# Patient Record
(demographics unavailable — no encounter records)

---

## 2024-12-07 NOTE — HISTORY OF PRESENT ILLNESS
[de-identified] : Patient is a 62-year-old female here for evaluation of left shoulder and elbow.  Patient states on 12/6/2024 she was dancing, fell on on outstretched left arm.  Patient states that she is immediate pain to the elbow with mild discomfort to the left shoulder.  Patient states that she was told in the past that she may need a future total shoulder replacement.  Patient went to the hospital x-rays were taken of the left shoulder and elbow.  Patient was told that she had elbow fracture was placed in a splint and told to follow-up with orthopedics.   Denies numbness tingling  Left elbow exam: No effusion no ecchymosis no erythema, tenderness palpation to radial head. mild decreased limited range of motion with guarding in all planes secondary to discomfort.  Good strength throughout with discomfort no gross instability neurovascular intact  Left shoulder exam: No effusion no ecchymosis no erythema, no tense palpation good range of motion with minimal discomfort no gross instability neurovascular intact.  Reviewed x-rays left elbow from hospital showing acute nondisplaced radial head fracture  Reviewed left shoulder x-rays from hospital showing no acute fractures, subluxations, dislocations.  Advanced degenerative changes  Left shoulder: Patient has left shoulder strain.  Patient currently declined prescription anti-inflammatory medication.  Advise rest, ice/heat, gentle range of motion.  Patient will follow-up in 6 weeks with sports department if still having discomfort.  Left elbow: Discussed with patient detail that she has a radial head fracture.  These fractures generally heal well conservatively.  Fractures can take up to 6 to 8 weeks to fully heal.  Placed patient in sling to wear at all times for comfort for 3 to 5 days.  After that time patient will come out of sling and start gentle range of motion exercises.  Patient will follow-up with Dr. Villafuerte in 2 weeks for repeat elbow x-rays and follow-up.  Patient will continue taking over-the-counter Motrin/Tylenol as needed for pain, kindly declined prescription medication.  Patient will call office if symptoms worsen or change.  Advised against high-impact activities, heavy lifting, pushing, pulling.

## 2024-12-20 NOTE — ASSESSMENT
[FreeTextEntry1] :  The patient is being seen today for a left elbow fx. She injured it after falling on her arm outstretched. She was dancing and she fell.  Her arm is doing much better than at her initial visit.   left elbow swelling and palpable hemarthrosis minimal pain with palpation to the radial head. no pain with palpation to the distal humerus or ulna near full range of motion to the elbow  full active motion to the shoulder wrist, hand and fingers neurovascular intact to median, ulnar, radial nerves and palpable pulses with cap refill <2s  right elbow without tenderness, full active range of motion and neurovascular intact   x-ray left elbow 3 views shows closed nondisplaced fx of head of left radius   The patient is doing extremely well. We discussed that the fracture is healing as expected and to continue focus on regaining ROM at this time. The patient was advised of the diagnosis.  The natural history of the pathology was explained in full to the patient in layman's terms. All questions were answered.  We discussed that the radial head has a function in mobility and support of the elbow joint.  Minimally displaced fractures of the radial head/neck generally do well with non operative treatment.  We reviewed that although many people lose terminal extension, this is tolerated and that functional range of motion is considered to be  flexion extension and 50/50 supination/pronation.  The patient understands that they need to work on range of motion exercises and that Xrays and range of motion will be re-evaluated in about 3 weeks.  Formal therapy may be needed.  For comfort I have advised the patient that they may wear a sling for up to 3 days after the injury but then should begin range of motion exercises to minimize stiffness. Prolonged sling wear may cause permanent stiffness.  Surgery is occasionally required for contracture release or fixation/radial head arthroplasty.  The patient verbalized understanding of the above. The patient will follow up as needed.

## 2025-01-08 NOTE — HISTORY OF PRESENT ILLNESS
[de-identified] : Patient is here today for evaluation of her right hip she points to the groin as well as the lateral aspect of the hip going on for the last 1 to 2 months occasional having pain all the way down the right lower extremity  was present during exam and discussion  Physical exam she is tender over the greater trochanteric area she has some low back pain with straight leg raising as well as some  pain with range of motion of the right  X-rays of the hip show fairly well-maintained joint spaces left and right hip on the pelvic exam no fractures or tumors seen Spine x-rays do show significant facet arthritis LS spine 2 views AP and lateral  Diagnosis is right hip pain, right greater trochanteric bursitis, lumbar radiculopathy  Recommend prescription anti-inflammatories physical therapy I will see her back in 6 weeks if she fails to improve would order an MRI of the right hip to evaluate the labrum and see if there is any bone edema

## 2025-02-13 NOTE — HISTORY OF PRESENT ILLNESS
[de-identified] : 63f for f/u of rt hip pain prior hx of bilateral tka x-rays show realtively moderate oa but exam is significant for pain with all motions of hip, very irritable hip will get MRI to further evaluate f/u after MRI

## 2025-03-07 NOTE — HISTORY OF PRESENT ILLNESS
[de-identified] : Patient is a 63-year-old female here for reevaluation of right hip pain.  Reviewed MRI results of right hip in detail patient showing anterior and superior acetabular labral tear with associated paralabral cyst.  Moderate degenerative changes, partial tears of the gluteal tendons at the insertions of upon the greater trochanter with mild trochanteric bursitis.  Patient states that she has been taking meloxicam which has been helping her pain.  Right hip exam no effusion no ecchymosis no erythema no tense palpation good range of motion with pain to groin, positive impingement no gross instability neurovascular intact  Discussed with patient in detail her MRI results, discussed further treatment options in detail including conservative versus surgical interventions, patient states at this time she is not interested in surgical intervention, advised physical therapy continuation of meloxicam as needed for pain, discussed possibility of cortisone injection with pain management to further help symptoms.  Patient will follow-up with pain management for consult on right hip injection.  Patient will follow-up if pain worsens or if interested in surgical intervention

## 2025-04-02 NOTE — DISCUSSION/SUMMARY
[de-identified] : A discussion regarding available pain management treatment options occurred with the patient. These included interventional, rehabilitative, pharmacological, and alternative modalities. We will proceed with the following:    Interventional treatment options: 1. Proceed with a Right hip injection under fluoroscopic guidance.  The patient is having significant right hip pain with minimal relief with conservative treatments, so we decided to proceed with an intra-articular hip injection. The risks and benefits were discussed which included bruising, hematoma, worse pain, intravascular injection, steroid reaction. All questions were answered and concerns addressed. The patient understands that this is likely a temporary solution to their pain. The patient may have up to three intra-articular joint injections a year and needs to consider surgical options for longer term relief of pain should they not improve. They will schedule at the earliest convenience.   Medications: 1. Ordered Methylprednisolone 21-tablet, 6-day taper pack  * This is meant to be taken should she have a flare up while she is away on Vacation *  I advised Ms. Peterson that the steroid should be taken with food.  In addition, while taking the prescribed steroid, no over the counter or other NSAIDs should be used, such as ibuprofen (Motrin or Advil) or naproxen (Aleve) as this can cause stomach upset or other side effects.  If needed for fever or breakthrough pain Tylenol can be used.    Complementary treatment options: - lifestyle modifications discussed - avoid bending and bend with knees - avoid heavy lifting   - Follow up 2 weeks post injection.   I Concepción Pitts attest that this documentation has been prepared under the direction and in the presence of provider Dr. Faheem Vargas.  The documentation recorded by the scribe in my presence, accurately reflects the service I personally performed, and the decisions made by me with my edits as appropriate.   Faheem Vargas, DO

## 2025-04-02 NOTE — DATA REVIEWED
[FreeTextEntry1] : MRI of the right hip taken on 2- @ [facility] radiology IMPRESSION: Anterior and superior acetabular labral tears with associated para labral cysts. Mild to moderate degenerative changes of the hip. Partial tears of the gluteal tendons at the insertions upon the greater trochanter with associated mild trochanteric bursitis

## 2025-04-02 NOTE — PHYSICAL EXAM
[de-identified] : Right hip: Inspection: no evidence of atrophy, effusion, rash     Palpation: + tenderness over anterior hip, adductors, PSIS, gluteus, GTB    Stability: no gross instability bilaterally    Alignment: normal    ROM:   Painful reduced ROM especially flexion, external and internal rotation.      Special tests:  CRISTINA + (groin pain)    Stability:  No gross instability bilaterally

## 2025-04-02 NOTE — HISTORY OF PRESENT ILLNESS
[FreeTextEntry1] : Ms. Peterson is a 63-year-old female presenting to establish care for her right hip pain. She is being referred by Orthopedics to undergo interventional treatments as she is not a surgical candidate at the present. Her pain is in the right hip with referred pain into the buttock, groin and anterior aspect of the groin. She describes the pain as sharp, shooting, throbbing and aching. She does also note some intermittent pain in the lower back at times. Her pain is made worse with weight bearing activities. She has no pain when sitting. She tends to walk with a limp. She has been getting in and out of a car like she is 90 years old. She rates the pain anywhere from a 6 to an 9/10 on the pain scale. Her pain is present daily. She denies any bowel/bladder incontinence, fevers/chills, recent weight loss or any saddle anesthesia. She has been managing her pain with Advil Dual Action.

## 2025-04-02 NOTE — PHYSICAL EXAM
[de-identified] : Right hip: Inspection: no evidence of atrophy, effusion, rash     Palpation: + tenderness over anterior hip, adductors, PSIS, gluteus, GTB    Stability: no gross instability bilaterally    Alignment: normal    ROM:   Painful reduced ROM especially flexion, external and internal rotation.      Special tests:  CRISTINA + (groin pain)    Stability:  No gross instability bilaterally

## 2025-04-02 NOTE — DISCUSSION/SUMMARY
[de-identified] : A discussion regarding available pain management treatment options occurred with the patient. These included interventional, rehabilitative, pharmacological, and alternative modalities. We will proceed with the following:    Interventional treatment options: 1. Proceed with a Right hip injection under fluoroscopic guidance.  The patient is having significant right hip pain with minimal relief with conservative treatments, so we decided to proceed with an intra-articular hip injection. The risks and benefits were discussed which included bruising, hematoma, worse pain, intravascular injection, steroid reaction. All questions were answered and concerns addressed. The patient understands that this is likely a temporary solution to their pain. The patient may have up to three intra-articular joint injections a year and needs to consider surgical options for longer term relief of pain should they not improve. They will schedule at the earliest convenience.   Medications: 1. Ordered Methylprednisolone 21-tablet, 6-day taper pack  * This is meant to be taken should she have a flare up while she is away on Vacation *  I advised Ms. Peterson that the steroid should be taken with food.  In addition, while taking the prescribed steroid, no over the counter or other NSAIDs should be used, such as ibuprofen (Motrin or Advil) or naproxen (Aleve) as this can cause stomach upset or other side effects.  If needed for fever or breakthrough pain Tylenol can be used.    Complementary treatment options: - lifestyle modifications discussed - avoid bending and bend with knees - avoid heavy lifting   - Follow up 2 weeks post injection.   I Concepción Pitts attest that this documentation has been prepared under the direction and in the presence of provider Dr. Faheem Vargas.  The documentation recorded by the scribe in my presence, accurately reflects the service I personally performed, and the decisions made by me with my edits as appropriate.   Faheem Vargas, DO

## 2025-04-02 NOTE — DATA REVIEWED
[FreeTextEntry1] : MRI of the right hip taken on 2- @ [facility] radiology IMPRESSION: Anterior and superior acetabular labral tears with associated para labral cysts. Mild to moderate degenerative changes of the hip. Partial tears of the gluteal tendons at the insertions upon the greater trochanter with associated mild trochanteric bursitis

## 2025-04-07 NOTE — PROCEDURE
[FreeTextEntry3] : Date of Service: <<EHR Stamp.Today's Date__-99019>> Date of Service: 04/07/2025   Account: [id number]  Patient: ROWDY DELGADO   YOB: 1962  Age: 63 year  Surgeon:      Faheem Vargas DO  Pre-Operative Diagnosis: Right Primary Osteoarthritis of Hip (M16.0)  Post-Operative Diagnosis: Same  Procedure: Right Hip arthrogram and steroid injection under fluoroscopic guidance.    This procedure was carried out using fluoroscopic guidance. The risks and benefits of the procedure were discussed extensively with the patient. The consent of the patient was obtained and the following procedure was performed. The patient was placed in the supine position on the fluoroscopic table and the area was prepped and draped in a sterile fashion. A timeout was performed with all essential staff present and the site and side were verified.  The patient was placed in the supine position with right hip flexed and externally rotated 25 degrees. The area of the right groin was prepped and draped in a sterile fashion. The fluoroscopic image intensifier was then positioned so that the right hip appeared in view, and the midline intertrochanteric region was identified and marked. A 25 gauge spinal needle was then inserted and directed into this right hip intra-capsular region. After negative aspiration for heme and CSF, 3 cc of Omnipaque was injected and appeared to fill the joint margins.  Right hip arthrogram showed no intravascular flow, and good spread around the femoral head and to the acetabulum. An injectate of 4 cc 0.25% Marcaine plus 10mg decadron was then injected into the right hip space.   The needle was subsequently removed. Vital signs remained normal. Pulse oximeter was used throughout the procedure and the patient's pulse and oxygen saturation remained within normal limits. The patient tolerated the procedure well. There were no complications. The patient was instructed to apply ice over the injection sites for twenty minutes every two hours for the next 24 to 48 hours.  Disposition:   1. The patient was advised to F/U in 1-2 weeks to assess the response to the injection.  2. The patient was also instructed to contact me immediately if there were any concerns related to the procedure performed.

## 2025-04-23 NOTE — DATA REVIEWED
[FreeTextEntry1] : MRI of the right hip taken on 2- @ [facility] radiology IMPRESSION: Anterior and superior acetabular labral tears with associated para labral cysts. Mild to moderate degenerative changes of the hip. Partial tears of the gluteal tendons at the insertions upon the greater trochanter with associated mild trochanteric bursitis

## 2025-04-23 NOTE — PHYSICAL EXAM
[de-identified] : Right hip: Inspection: no evidence of atrophy, effusion, rash     Palpation: + tenderness over anterior hip, adductors, PSIS, gluteus, GTB    Stability: no gross instability bilaterally    Alignment: normal    ROM:   Painful reduced ROM especially flexion, external and internal rotation.      Special tests:  CRISTINA + (groin pain)    Stability:  No gross instability bilaterally   L spine   No rashes, erythema, edema noted TTP right lumbar paraspinals and sciatic notch Positive straight leg raise on the right LLE: 5/5 strength RLE: EHL 4/5  Sensation intact b/l LE

## 2025-04-23 NOTE — DATA REVIEWED
[FreeTextEntry1] : MRI of the right hip taken on 2- @ [facility] radiology IMPRESSION: Anterior and superior acetabular labral tears with associated para labral cysts. Mild to moderate degenerative changes of the hip. Partial tears of the gluteal tendons at the insertions upon the greater trochanter with associated mild trochanteric bursitis

## 2025-04-23 NOTE — DISCUSSION/SUMMARY
[de-identified] : A discussion regarding available pain management treatment options occurred with the patient. These included interventional, rehabilitative, pharmacological, and alternative modalities. We will proceed with the following:    Interventional treatment options: - None indicated at this time.   Imagin. MRI lumbar spine w/o contrast to evaluate for anatomic changes of the lumbar discs, nerves, and surrounding tissue that will help provide information to accurately diagnose the patient's cause of pain and therefore treat said pain generator in the most effective way possible - whether that be specific physical therapy recommendations, medications, and/or interventional therapies.   Rehabilitative options: 1. Participate in physical therapy for the lumbar spine, 2/3x per week for 6-8 weeks.  Physical therapy of the lumbar spine 2-3 times a week for 4-8 weeks stressing a home exercise program of walking, shoulder griddle strengthening,  swimming, elliptical , recumbent bike, Richard chi and Yoga. Use things that heat like hot shower or icy heat before rehab, exercising and at the beginning of the day, and ice (ice in a bag never directly on the skin) after activity and at the end of the day.   Complementary treatment options: - lifestyle modifications discussed - avoid bending and bend with knees - avoid heavy lifting   - Follow up in 2-3 weeks to discuss imaging.   I Concepción Pitts attest that this documentation has been prepared under the direction and in the presence of provider Dr. Faheem Vargas.  The documentation recorded by the scribe in my presence, accurately reflects the service I personally performed, and the decisions made by me with my edits as appropriate.   Faheem Vargas, DO

## 2025-04-23 NOTE — DISCUSSION/SUMMARY
[de-identified] : A discussion regarding available pain management treatment options occurred with the patient. These included interventional, rehabilitative, pharmacological, and alternative modalities. We will proceed with the following:    Interventional treatment options: - None indicated at this time.   Imagin. MRI lumbar spine w/o contrast to evaluate for anatomic changes of the lumbar discs, nerves, and surrounding tissue that will help provide information to accurately diagnose the patient's cause of pain and therefore treat said pain generator in the most effective way possible - whether that be specific physical therapy recommendations, medications, and/or interventional therapies.   Rehabilitative options: 1. Participate in physical therapy for the lumbar spine, 2/3x per week for 6-8 weeks.  Physical therapy of the lumbar spine 2-3 times a week for 4-8 weeks stressing a home exercise program of walking, shoulder griddle strengthening,  swimming, elliptical , recumbent bike, Richard chi and Yoga. Use things that heat like hot shower or icy heat before rehab, exercising and at the beginning of the day, and ice (ice in a bag never directly on the skin) after activity and at the end of the day.   Complementary treatment options: - lifestyle modifications discussed - avoid bending and bend with knees - avoid heavy lifting   - Follow up in 2-3 weeks to discuss imaging.   I Concepción Pitts attest that this documentation has been prepared under the direction and in the presence of provider Dr. Faheem Vargas.  The documentation recorded by the scribe in my presence, accurately reflects the service I personally performed, and the decisions made by me with my edits as appropriate.   Faheem Vargas, DO

## 2025-04-23 NOTE — HISTORY OF PRESENT ILLNESS
[FreeTextEntry1] : Ms. Peterson is a 63-year-old female presenting to establish care for her right hip pain. She is being referred by Orthopedics to undergo interventional treatments as she is not a surgical candidate at the present. Her pain is in the right hip with referred pain into the buttock, groin and anterior aspect of the groin. She describes the pain as sharp, shooting, throbbing and aching. She does also note some intermittent pain in the lower back at times. Her pain is made worse with weight bearing activities. She has no pain when sitting. She tends to walk with a limp. She has been getting in and out of a car like she is 90 years old. She rates the pain anywhere from a 6 to an 9/10 on the pain scale. Her pain is present daily. She denies any bowel/bladder incontinence, fevers/chills, recent weight loss or any saddle anesthesia. She has been managing her pain with Advil Dual Action.   4-: Revisit encounter. She is accompanied by her  today.   Since her last office visit, she underwent a Right hip injection on 4-7-2025. She affords almost 100% relief of her right hip pain. Today, she is presenting with mainly lower back pain. She does note radicular feature referring into the entire right leg. She states there is a constant numbness in the right leg. She states her symptoms are present daily and made worse with standing, sitting, walking or doing any prolonged physical activity. She rates the pain at a 7/10 on the pain scale.

## 2025-04-23 NOTE — PHYSICAL EXAM
[de-identified] : Right hip: Inspection: no evidence of atrophy, effusion, rash     Palpation: + tenderness over anterior hip, adductors, PSIS, gluteus, GTB    Stability: no gross instability bilaterally    Alignment: normal    ROM:   Painful reduced ROM especially flexion, external and internal rotation.      Special tests:  CRISTINA + (groin pain)    Stability:  No gross instability bilaterally   L spine   No rashes, erythema, edema noted TTP right lumbar paraspinals and sciatic notch Positive straight leg raise on the right LLE: 5/5 strength RLE: EHL 4/5  Sensation intact b/l LE